# Patient Record
Sex: FEMALE | Race: WHITE | Employment: FULL TIME | URBAN - METROPOLITAN AREA
[De-identification: names, ages, dates, MRNs, and addresses within clinical notes are randomized per-mention and may not be internally consistent; named-entity substitution may affect disease eponyms.]

---

## 2018-05-16 VITALS
SYSTOLIC BLOOD PRESSURE: 111 MMHG | BODY MASS INDEX: 29.45 KG/M2 | HEIGHT: 60 IN | RESPIRATION RATE: 16 BRPM | TEMPERATURE: 97.5 F | DIASTOLIC BLOOD PRESSURE: 70 MMHG | HEART RATE: 90 BPM | OXYGEN SATURATION: 99 % | WEIGHT: 150 LBS

## 2018-05-16 ASSESSMENT — PAIN DESCRIPTION - PAIN TYPE: TYPE: ACUTE PAIN

## 2018-05-16 ASSESSMENT — PAIN DESCRIPTION - ONSET: ONSET: ON-GOING

## 2018-05-16 ASSESSMENT — PAIN DESCRIPTION - PROGRESSION: CLINICAL_PROGRESSION: NOT CHANGED

## 2018-05-16 ASSESSMENT — PAIN DESCRIPTION - ORIENTATION: ORIENTATION: LEFT

## 2018-05-16 ASSESSMENT — PAIN SCALES - GENERAL: PAINLEVEL_OUTOF10: 7

## 2018-05-16 ASSESSMENT — PAIN DESCRIPTION - DESCRIPTORS: DESCRIPTORS: DULL;STABBING

## 2018-05-16 ASSESSMENT — PAIN DESCRIPTION - FREQUENCY: FREQUENCY: CONTINUOUS

## 2018-05-16 ASSESSMENT — PAIN DESCRIPTION - LOCATION: LOCATION: FOOT

## 2018-05-17 ENCOUNTER — APPOINTMENT (OUTPATIENT)
Dept: GENERAL RADIOLOGY | Age: 23
End: 2018-05-17
Payer: COMMERCIAL

## 2018-05-17 ENCOUNTER — HOSPITAL ENCOUNTER (EMERGENCY)
Age: 23
Discharge: HOME | End: 2018-05-17
Payer: COMMERCIAL

## 2018-05-17 DIAGNOSIS — S92.902A CLOSED FRACTURE OF LEFT FOOT, INITIAL ENCOUNTER: Primary | ICD-10-CM

## 2018-05-17 PROCEDURE — 29515 APPLICATION SHORT LEG SPLINT: CPT

## 2018-05-17 PROCEDURE — 99283 EMERGENCY DEPT VISIT LOW MDM: CPT

## 2018-05-17 PROCEDURE — 73630 X-RAY EXAM OF FOOT: CPT

## 2018-05-17 PROCEDURE — 6370000000 HC RX 637 (ALT 250 FOR IP): Performed by: NURSE PRACTITIONER

## 2018-05-17 RX ORDER — IBUPROFEN 800 MG/1
800 TABLET ORAL EVERY 8 HOURS PRN
Qty: 21 TABLET | Refills: 0 | Status: SHIPPED | OUTPATIENT
Start: 2018-05-17 | End: 2018-07-03 | Stop reason: SDUPTHER

## 2018-05-17 RX ORDER — OXYCODONE HYDROCHLORIDE AND ACETAMINOPHEN 5; 325 MG/1; MG/1
1 TABLET ORAL ONCE
Status: COMPLETED | OUTPATIENT
Start: 2018-05-17 | End: 2018-05-17

## 2018-05-17 RX ADMIN — OXYCODONE HYDROCHLORIDE AND ACETAMINOPHEN 1 TABLET: 5; 325 TABLET ORAL at 01:12

## 2018-05-17 ASSESSMENT — PAIN SCALES - GENERAL
PAINLEVEL_OUTOF10: 7
PAINLEVEL_OUTOF10: 3

## 2018-07-03 ENCOUNTER — APPOINTMENT (OUTPATIENT)
Dept: CT IMAGING | Age: 23
End: 2018-07-03
Payer: OTHER MISCELLANEOUS

## 2018-07-03 ENCOUNTER — APPOINTMENT (OUTPATIENT)
Dept: GENERAL RADIOLOGY | Age: 23
End: 2018-07-03
Payer: OTHER MISCELLANEOUS

## 2018-07-03 ENCOUNTER — HOSPITAL ENCOUNTER (EMERGENCY)
Age: 23
Discharge: HOME | End: 2018-07-03
Payer: OTHER MISCELLANEOUS

## 2018-07-03 VITALS
TEMPERATURE: 97.4 F | RESPIRATION RATE: 14 BRPM | WEIGHT: 180 LBS | DIASTOLIC BLOOD PRESSURE: 81 MMHG | BODY MASS INDEX: 35.34 KG/M2 | SYSTOLIC BLOOD PRESSURE: 130 MMHG | HEART RATE: 98 BPM | OXYGEN SATURATION: 99 % | HEIGHT: 60 IN

## 2018-07-03 DIAGNOSIS — S09.90XA CLOSED HEAD INJURY, INITIAL ENCOUNTER: Primary | ICD-10-CM
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
## 2018-07-03 LAB
CHP ED QC CHECK: YES
PREGNANCY TEST URINE, POC: NEGATIVE

## 2018-07-03 PROCEDURE — 6370000000 HC RX 637 (ALT 250 FOR IP): Performed by: NURSE PRACTITIONER

## 2018-07-03 PROCEDURE — 72125 CT NECK SPINE W/O DYE: CPT

## 2018-07-03 PROCEDURE — 70450 CT HEAD/BRAIN W/O DYE: CPT

## 2018-07-03 PROCEDURE — 99284 EMERGENCY DEPT VISIT MOD MDM: CPT

## 2018-07-03 PROCEDURE — 71046 X-RAY EXAM CHEST 2 VIEWS: CPT

## 2018-07-03 RX ORDER — CYCLOBENZAPRINE HCL 5 MG
5 TABLET ORAL 3 TIMES DAILY PRN
Qty: 21 TABLET | Refills: 0 | Status: SHIPPED | OUTPATIENT
Start: 2018-07-03 | End: 2018-07-13

## 2018-07-03 RX ORDER — IBUPROFEN 800 MG/1
800 TABLET ORAL ONCE
Status: COMPLETED | OUTPATIENT
Start: 2018-07-03 | End: 2018-07-03

## 2018-07-03 RX ORDER — IBUPROFEN 800 MG/1
800 TABLET ORAL EVERY 8 HOURS PRN
Qty: 21 TABLET | Refills: 0 | Status: SHIPPED | OUTPATIENT
Start: 2018-07-03 | End: 2019-06-27

## 2018-07-03 RX ADMIN — IBUPROFEN 800 MG: 800 TABLET ORAL at 18:23

## 2018-07-03 ASSESSMENT — PAIN DESCRIPTION - DESCRIPTORS: DESCRIPTORS: ACHING

## 2018-07-03 ASSESSMENT — PAIN DESCRIPTION - PROGRESSION: CLINICAL_PROGRESSION: GRADUALLY WORSENING

## 2018-07-03 ASSESSMENT — PAIN SCALES - GENERAL
PAINLEVEL_OUTOF10: 7
PAINLEVEL_OUTOF10: 7

## 2018-07-03 ASSESSMENT — PAIN DESCRIPTION - PAIN TYPE: TYPE: ACUTE PAIN

## 2018-07-03 ASSESSMENT — PAIN DESCRIPTION - FREQUENCY: FREQUENCY: CONTINUOUS

## 2018-07-03 ASSESSMENT — PAIN DESCRIPTION - LOCATION: LOCATION: HEAD

## 2018-07-03 NOTE — ED NOTES
Patient states she was ambulatory and able to get out of the car on her own      Nancy Eric, [address] [facility]  07/03/18 4780
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
## 2018-10-14 ENCOUNTER — APPOINTMENT (OUTPATIENT)
Dept: GENERAL RADIOLOGY | Age: 23
End: 2018-10-14
Payer: COMMERCIAL

## 2018-10-14 ENCOUNTER — HOSPITAL ENCOUNTER (EMERGENCY)
Age: 23
Discharge: HOME | End: 2018-10-14
Payer: COMMERCIAL

## 2018-10-14 VITALS
HEIGHT: 60 IN | HEART RATE: 72 BPM | OXYGEN SATURATION: 98 % | WEIGHT: 170 LBS | SYSTOLIC BLOOD PRESSURE: 131 MMHG | RESPIRATION RATE: 16 BRPM | TEMPERATURE: 97.5 F | BODY MASS INDEX: 33.38 KG/M2 | DIASTOLIC BLOOD PRESSURE: 81 MMHG

## 2018-10-14 DIAGNOSIS — K59.00 CONSTIPATION, UNSPECIFIED CONSTIPATION TYPE: ICD-10-CM

## 2018-10-14 DIAGNOSIS — R10.84 GENERALIZED ABDOMINAL PAIN: Primary | ICD-10-CM

## 2018-10-14 LAB
ALBUMIN SERPL-MCNC: 4.1 G/DL (ref 3.5–5.2)
ALP BLD-CCNC: 83 U/L (ref 35–104)
ALT SERPL-CCNC: 68 U/L (ref 0–32)
ANION GAP SERPL CALCULATED.3IONS-SCNC: 8 MMOL/L (ref 7–16)
AST SERPL-CCNC: 66 U/L (ref 0–31)
BACTERIA: ABNORMAL /HPF
BASOPHILS ABSOLUTE: 0.08 E9/L (ref 0–0.2)
BASOPHILS RELATIVE PERCENT: 0.6 % (ref 0–2)
BILIRUB SERPL-MCNC: 0.2 MG/DL (ref 0–1.2)
BILIRUBIN URINE: NEGATIVE
BLOOD, URINE: ABNORMAL
BUN BLDV-MCNC: 13 MG/DL (ref 6–20)
CALCIUM SERPL-MCNC: 8.9 MG/DL (ref 8.6–10.2)
CHLORIDE BLD-SCNC: 103 MMOL/L (ref 98–107)
CHP ED QC CHECK: YES
CLARITY: CLEAR
CO2: 23 MMOL/L (ref 22–29)
COLOR: ABNORMAL
CREAT SERPL-MCNC: 0.7 MG/DL (ref 0.5–1)
EOSINOPHILS ABSOLUTE: 0.03 E9/L (ref 0.05–0.5)
EOSINOPHILS RELATIVE PERCENT: 0.2 % (ref 0–6)
GFR AFRICAN AMERICAN: >60
GFR NON-AFRICAN AMERICAN: >60 ML/MIN/1.73
GLUCOSE BLD-MCNC: 108 MG/DL (ref 74–109)
GLUCOSE URINE: NEGATIVE MG/DL
HCT VFR BLD CALC: 42.3 % (ref 34–48)
HEMOGLOBIN: 13.5 G/DL (ref 11.5–15.5)
IMMATURE GRANULOCYTES #: 0.04 E9/L
IMMATURE GRANULOCYTES %: 0.3 % (ref 0–5)
KETONES, URINE: NEGATIVE MG/DL
LACTIC ACID: 1.1 MMOL/L (ref 0.5–2.2)
LEUKOCYTE ESTERASE, URINE: NEGATIVE
LYMPHOCYTES ABSOLUTE: 0.91 E9/L (ref 1.5–4)
LYMPHOCYTES RELATIVE PERCENT: 7.2 % (ref 20–42)
MCH RBC QN AUTO: 26.8 PG (ref 26–35)
MCHC RBC AUTO-ENTMCNC: 31.9 % (ref 32–34.5)
MCV RBC AUTO: 83.9 FL (ref 80–99.9)
MONOCYTES ABSOLUTE: 0.54 E9/L (ref 0.1–0.95)
MONOCYTES RELATIVE PERCENT: 4.3 % (ref 2–12)
NEUTROPHILS ABSOLUTE: 11.04 E9/L (ref 1.8–7.3)
NEUTROPHILS RELATIVE PERCENT: 87.4 % (ref 43–80)
NITRITE, URINE: NEGATIVE
PDW BLD-RTO: 13.9 FL (ref 11.5–15)
PH UA: 6 (ref 5–9)
PLATELET # BLD: 340 E9/L (ref 130–450)
PMV BLD AUTO: 10.7 FL (ref 7–12)
POTASSIUM SERPL-SCNC: 4 MMOL/L (ref 3.5–5)
POTASSIUM SERPL-SCNC: >10 MMOL/L (ref 3.5–5)
PREGNANCY TEST URINE, POC: NEGATIVE
PROTEIN UA: 30 MG/DL
RBC # BLD: 5.04 E12/L (ref 3.5–5.5)
RBC UA: >20 /HPF (ref 0–2)
SODIUM BLD-SCNC: 134 MMOL/L (ref 132–146)
SPECIFIC GRAVITY UA: 1.01 (ref 1–1.03)
TOTAL PROTEIN: 7.3 G/DL (ref 6.4–8.3)
UROBILINOGEN, URINE: 0.2 E.U./DL
WBC # BLD: 12.6 E9/L (ref 4.5–11.5)
WBC UA: ABNORMAL /HPF (ref 0–5)

## 2018-10-14 PROCEDURE — 83605 ASSAY OF LACTIC ACID: CPT

## 2018-10-14 PROCEDURE — 80053 COMPREHEN METABOLIC PANEL: CPT

## 2018-10-14 PROCEDURE — 74019 RADEX ABDOMEN 2 VIEWS: CPT

## 2018-10-14 PROCEDURE — 6370000000 HC RX 637 (ALT 250 FOR IP): Performed by: NURSE PRACTITIONER

## 2018-10-14 PROCEDURE — 81001 URINALYSIS AUTO W/SCOPE: CPT

## 2018-10-14 PROCEDURE — 84132 ASSAY OF SERUM POTASSIUM: CPT

## 2018-10-14 PROCEDURE — 6360000002 HC RX W HCPCS: Performed by: PHYSICIAN ASSISTANT

## 2018-10-14 PROCEDURE — 87088 URINE BACTERIA CULTURE: CPT

## 2018-10-14 PROCEDURE — 36415 COLL VENOUS BLD VENIPUNCTURE: CPT

## 2018-10-14 PROCEDURE — 85025 COMPLETE CBC W/AUTO DIFF WBC: CPT

## 2018-10-14 PROCEDURE — 99284 EMERGENCY DEPT VISIT MOD MDM: CPT

## 2018-10-14 RX ORDER — ONDANSETRON 4 MG/1
4 TABLET, ORALLY DISINTEGRATING ORAL ONCE
Status: COMPLETED | OUTPATIENT
Start: 2018-10-14 | End: 2018-10-14

## 2018-10-14 RX ORDER — SENNA AND DOCUSATE SODIUM 50; 8.6 MG/1; MG/1
1 TABLET, FILM COATED ORAL DAILY
Qty: 5 TABLET | Refills: 0 | Status: SHIPPED | OUTPATIENT
Start: 2018-10-14 | End: 2019-06-27

## 2018-10-14 RX ADMIN — ONDANSETRON 4 MG: 4 TABLET, ORALLY DISINTEGRATING ORAL at 14:38

## 2018-10-14 RX ADMIN — MAGNESIUM CITRATE 300 ML: 1.75 LIQUID ORAL at 16:41

## 2018-10-14 ASSESSMENT — PAIN DESCRIPTION - ORIENTATION: ORIENTATION: LEFT

## 2018-10-14 ASSESSMENT — PAIN DESCRIPTION - DESCRIPTORS: DESCRIPTORS: STABBING

## 2018-10-14 ASSESSMENT — PAIN DESCRIPTION - LOCATION: LOCATION: ABDOMEN

## 2018-10-14 ASSESSMENT — PAIN SCALES - GENERAL: PAINLEVEL_OUTOF10: 8

## 2018-10-14 ASSESSMENT — PAIN DESCRIPTION - PAIN TYPE: TYPE: ACUTE PAIN

## 2018-10-14 NOTE — ED PROVIDER NOTES
Independent [facility]       Department of Emergency Medicine   ED  Provider Note  Admit Date/Time: 10/14/2018  2:13 PM  ED Bed: 06/06  MRN: [id number]  Chief Complaint       Abdominal Pain (starting at 0830 today, left sided abd pain, with n/v no diarrhea, no fever or chills )    History of Present Illness   Source of history provided by:  patient and parent. History/Exam Limitations: none. Judith Muñoz is a 20 y.o. old female who has a past medical history of:   Past Medical History:   Diagnosis Date    Acid reflux     Hydradenitis     presents to the emergency department by private vehicle and ambulatory, for complaints of sudden onset aching, sharp pain in the LLQ without radiation which began several hour(s) prior to arrival which has since resolved. There has been similar episodes in the past as she has a history of constipation especially a week before her period which she is due. She also reports Trintellix giving her constipation therefore she has not taken it in a while. LMP on time and of typical flow and duration. Since onset the symptoms have been improving. The pain is associated with urinary frequency, urinary urgency and vomiting. The pain is aggravated by nothing in particular and self limiting. There has been NO back pain, chills, cloudy urine, diarrhea, vaginal discharge, vomiting or fever. ROS   Pertinent positives and negatives are stated within HPI, all other systems reviewed and are negative. Past Surgical History:   Procedure Laterality Date    ARM SURGERY      WISDOM TOOTH EXTRACTION  03/08/2015   Social History:  reports that she has been smoking Cigarettes. She has a 5.00 pack-year smoking history. She has never used smokeless tobacco. She reports that she drinks alcohol. She reports that she does not use drugs. Family History: family history includes Diabetes in her mother; Hypertension in her father.    Allergies: Bee venom    Physical Exam           ED Triage Vitals (4 mg Oral Given 10/14/18 5598)        Re-examination:  10/14/18       Time: 1615    Patients symptoms show no change. She continues to deny abdominal pain. Her abdomen is soft with active bowel sounds and no change in physical exam. Discussed results at length as well as outpatient plan of care which patient and mother are amenable. Consults:   None    Procedures:   none    MDM:   Labs and diagnostics as resulted above. Patient has no abdominal pain in ED. there is no evidence of acute abdomen as her abdomen is soft, nontender and active bowel sounds. Plan is for magnesium citrate, daily Shauna-Colace for five days and outpatient follow-up with a primary care physician. She is aware signs and symptoms to watch for return to ED for any new or worsening symptoms. Otherwise patient is well-appearing, nontoxic and appropriate for outpatient treatment and management at the time of her evaluation. Counseling: The emergency provider has spoken with the patient and daughter and discussed todays results, in addition to providing specific details for the plan of care and counseling regarding the diagnosis and prognosis. Questions are answered at this time and they are agreeable with the plan. Assessment      1. Generalized abdominal pain    2. Constipation, unspecified constipation type      Plan   Discharge to home  Patient condition is stable    New Medications     New Prescriptions    SENNOSIDES-DOCUSATE SODIUM (SENOKOT-S) 8.6-50 MG TABLET    Take 1 tablet by mouth daily     Electronically signed by EDDIE Granger CNP   DD: 10/14/18  **This report was transcribed using voice recognition software. Every effort was made to ensure accuracy; however, inadvertent computerized transcription errors may be present.   END OF ED PROVIDER NOTE       EDDIE Granger CNP  10/14/18 3499

## 2018-10-16 LAB — URINE CULTURE, ROUTINE: NORMAL

## 2022-04-10 ENCOUNTER — APPOINTMENT (OUTPATIENT)
Dept: GENERAL RADIOLOGY | Age: 27
End: 2022-04-10
Payer: COMMERCIAL

## 2022-04-10 ENCOUNTER — HOSPITAL ENCOUNTER (EMERGENCY)
Age: 27
Discharge: HOME | End: 2022-04-10
Payer: COMMERCIAL

## 2022-04-10 VITALS
RESPIRATION RATE: 16 BRPM | OXYGEN SATURATION: 99 % | SYSTOLIC BLOOD PRESSURE: 130 MMHG | BODY MASS INDEX: 39.27 KG/M2 | TEMPERATURE: 98.3 F | HEART RATE: 103 BPM | WEIGHT: 200 LBS | HEIGHT: 60 IN | DIASTOLIC BLOOD PRESSURE: 78 MMHG

## 2022-04-10 DIAGNOSIS — S62.102A CLOSED FRACTURE OF LEFT WRIST, INITIAL ENCOUNTER: Primary | ICD-10-CM

## 2022-04-10 PROCEDURE — 6370000000 HC RX 637 (ALT 250 FOR IP): Performed by: NURSE PRACTITIONER

## 2022-04-10 PROCEDURE — 73100 X-RAY EXAM OF WRIST: CPT

## 2022-04-10 PROCEDURE — 99283 EMERGENCY DEPT VISIT LOW MDM: CPT

## 2022-04-10 PROCEDURE — 29125 APPL SHORT ARM SPLINT STATIC: CPT

## 2022-04-10 PROCEDURE — 73110 X-RAY EXAM OF WRIST: CPT

## 2022-04-10 RX ORDER — IBUPROFEN 800 MG/1
800 TABLET ORAL EVERY 8 HOURS PRN
Qty: 21 TABLET | Refills: 0 | Status: SHIPPED | OUTPATIENT
Start: 2022-04-10 | End: 2022-08-12

## 2022-04-10 RX ORDER — HYDROCODONE BITARTRATE AND ACETAMINOPHEN 5; 325 MG/1; MG/1
1 TABLET ORAL EVERY 4 HOURS PRN
Qty: 8 TABLET | Refills: 0 | Status: SHIPPED | OUTPATIENT
Start: 2022-04-10 | End: 2022-04-13

## 2022-04-10 RX ORDER — IBUPROFEN 800 MG/1
800 TABLET ORAL ONCE
Status: COMPLETED | OUTPATIENT
Start: 2022-04-10 | End: 2022-04-10

## 2022-04-10 RX ADMIN — IBUPROFEN 800 MG: 800 TABLET, FILM COATED ORAL at 21:09

## 2022-04-10 ASSESSMENT — PAIN DESCRIPTION - ORIENTATION: ORIENTATION: LEFT

## 2022-04-10 ASSESSMENT — PAIN SCALES - GENERAL
PAINLEVEL_OUTOF10: 8
PAINLEVEL_OUTOF10: 8

## 2022-04-10 ASSESSMENT — PAIN DESCRIPTION - LOCATION: LOCATION: WRIST

## 2022-04-10 NOTE — Clinical Note
Vivian Doran was seen and treated in our emergency department on 4/10/2022. She may return to work on 04/16/2022. If you have any questions or concerns, please don't hesitate to call.       Nikia Abdi, APRN - CNP

## 2022-04-11 NOTE — ED PROVIDER NOTES
Independent   HPI:  4/10/22, Time: 8:57 PM EDT         Joelle Ohara is a 27 y.o. female presenting to the ED for left wrist pain. Patient presents to the emergency department after having a mechanical fall 3 to 4 hours prior to arrival.  Patient reports that she fell while skating. She attempted to break her fall by putting her hands out francis behind her back but ended up still injuring her left wrist.  Patient does have notable deformity to the dorsal aspect of the left radial aspect. She reports the pain will radiate up into the forearm. She denies taking anything for pain relief. She denies any unusual paresthesia. She is not on any anticoagulant therapy. Patient denies any other areas of injury. She states that throughout the evening hours the pain worsened which brought her here to the emergency department. Symptoms moderate in severity and persistent. Review of Systems:   A complete review of systems was performed and pertinent positives and negatives are stated within HPI, all other systems reviewed and are negative.          --------------------------------------------- PAST HISTORY ---------------------------------------------  Past Medical History:  has a past medical history of Acid reflux and Hydradenitis. Past Surgical History:  has a past surgical history that includes Arm Surgery and [place] tooth extraction (03/08/2015). Social History:  reports that she has quit smoking. Her smoking use included cigarettes. She has a 5.00 pack-year smoking history. She has never used smokeless tobacco. She reports that she does not drink alcohol and does not use drugs. Family History: family history includes Diabetes in her mother; Hypertension in her father. The patients home medications have been reviewed.     Allergies: Bee venom    -------------------------------------------------- RESULTS -------------------------------------------------  All laboratory and radiology results have been personally reviewed by myself   LABS:  No results found for this visit on 04/10/22. RADIOLOGY:  Interpreted by Radiologist.  XR WRIST LEFT (2 VIEWS)   Final Result   Acute nondisplaced intra-articular fracture of the distal left radius. XR WRIST LEFT (MIN 3 VIEWS)   Final Result   Comminuted, intra-articular distal radius fracture.             ------------------------- NURSING NOTES AND VITALS REVIEWED ---------------------------   The nursing notes within the ED encounter and vital signs as below have been reviewed. /78   Pulse 103   Temp 98.3 °F (36.8 °C) (Oral)   Resp 16   Ht 5' (1.524 m)   Wt 200 lb (90.7 kg)   SpO2 99%   BMI 39.06 kg/m²   Oxygen Saturation Interpretation: Normal      ---------------------------------------------------PHYSICAL EXAM--------------------------------------      Constitutional/General: Alert and oriented x3, mildly uncomfortable  Head: Normocephalic and atraumatic  Eyes: PERRL, EOMI  Mouth: Oropharynx clear, handling secretions, no trismus  Neck: Supple, full ROM,   Pulmonary: Lungs clear to auscultation bilaterally, no wheezes, rales, or rhonchi. Not in respiratory distress  Cardiovascular:  Regular rate and rhythm, no murmurs, gallops, or rubs. 2+ distal pulses  Abdomen: Soft, non tender, non distended,   Extremities: Moves all extremities x 4. Warm and well perfused, patient with point tenderness to the dorsal aspect of the left wrist radial side. She does have 2+ radial pulse. Unable to perform pronation or supination and patient did not attempt this exam due to notable deformity. She does have grasp. And full sensations intact. Compartments otherwise soft.   Skin: warm and dry without rash  Neurologic: GCS 15,  Psych: Normal Affect      ------------------------------ ED COURSE/MEDICAL DECISION MAKING----------------------  Medications   ibuprofen (ADVIL;MOTRIN) tablet 800 mg (800 mg Oral Given 4/10/22 2109)         ED COURSE:       Medical Decision Making:    Plan be for imaging, will medicate for symptom relief. X-ray resulted, showing comminuted intra-articular distal radius fracture. Patient was made aware of this finding. Will consult orthopedic surgery resident. Please refer to orthopedic surgery resident note. Patient did have a bedside splint applied by the orthopedic resident. Patient will be discharged home with close follow-up by orthopedic surgeon within 1 week. She was educated on nonweightbearing status to left upper extremity, the importance of rest, ice, compression and elevation as well as taking meds for symptom relief and when to return back to the emergency department. Patient neurovascular intact. Patient expressed understanding and safely discharged home with prescriptions for pain relief. Counseling: The emergency provider has spoken with the patient and discussed todays results, in addition to providing specific details for the plan of care and counseling regarding the diagnosis and prognosis. Questions are answered at this time and they are agreeable with the plan.      --------------------------------- IMPRESSION AND DISPOSITION ---------------------------------    IMPRESSION  1. Closed fracture of left wrist, initial encounter        DISPOSITION  Disposition: Discharge to home  Patient condition is good      NOTE: This report was transcribed using voice recognition software.  Every effort was made to ensure accuracy; however, inadvertent computerized transcription errors may be present     EDDIE Adler CNP  04/11/22 0336      ATTENDING PROVIDER ATTESTATION:     Supervising Physician, on-site, available for consultation, non-participatory in the evaluation or care of this patient         Oksana Adams MD  04/11/22 0817

## 2022-04-11 NOTE — CONSULTS
Department of Orthopedic Surgery  Resident Consult Note          Reason for Consult: Left wrist pain after fall    HISTORY OF PRESENT ILLNESS:       Patient is a 27 y.o. female who presents with who presents with a chief complaint of left wrist pain after fall. Patient states she was rollerblading earlier today when she lost her balance and fell backwards onto extended wrist.  Patient noted that she had immediate onset left wrist pain. Had difficulty moving her wrist without pain. States that she is right-hand dominant. Denies any previous left wrist problems. States that his sharp in nature and worse with any movement. She denies any deformity to the left wrist.  Currently states that she does have some paresthesias into the entire hand. States it is better than it was immediately after the injury. Currently denying any other orthopedic complaints at this time. Past Medical History:        Diagnosis Date    Acid reflux     Hydradenitis      Past Surgical History:        Procedure Laterality Date    ARM SURGERY      WISDOM TOOTH EXTRACTION  03/08/2015     Current Medications:   No current facility-administered medications for this encounter. Allergies:  Bee venom    Social History:   TOBACCO:   reports that she has quit smoking. Her smoking use included cigarettes. She has a 5.00 pack-year smoking history. She has never used smokeless tobacco.  ETOH:   reports no history of alcohol use. DRUGS:   reports no history of drug use.   ACTIVITIES OF DAILY LIVING:    OCCUPATION:    Family History:       Problem Relation Age of Onset    Diabetes Mother     Hypertension Father        REVIEW OF SYSTEMS:  CONSTITUTIONAL:  negative for  fevers, chills  EYES:  negative for acute vision changes  HEENT:  negative for cough, hearing loss  RESPIRATORY:  negative for  dyspnea, wheezing  CARDIOVASCULAR:  negative for  chest pain  GASTROINTESTINAL:  negative for nausea, vomiting  GENITOURINARY:  negative for frequency, urinary incontinence  HEMATOLOGIC/LYMPHATIC:  negative for bleeding  MUSCULOSKELETAL:  positive for left wrist pain and swelling  NEUROLOGICAL:  negative for headaches, dizziness  BEHAVIOR/PSYCH:  negative for increased agitation and anxiety    PHYSICAL EXAM:    VITALS:  /78   Pulse 103   Temp 98.3 °F (36.8 °C) (Oral)   Resp 16   Ht 5' (1.524 m)   Wt 200 lb (90.7 kg)   SpO2 99%   BMI 39.06 kg/m²   CONSTITUTIONAL:  awake, alert, cooperative, no apparent distress, and appears stated age  MUSCULOSKELETAL:  Left upper Extremity:  · Skin examination reveals no superficial lacerations or abrasions. There is dorsal soft tissue edema noted over the wrist and the distal forearm. Mild edema noted to the hand. No ecchymosis or erythema noted at this time  · TTP over the distal radius dorsal greater than volar. As well as the distal dorsal forearm. No tenderness palpation about the fingers, hand, proximal forearm, elbow, arm, shoulder, clavicle  · Pain with both passive and active range of motion  · Sensation intact light touch distally in pain, radial, ulnar nerve distribution  · Motor function grossly intact distally in AIN, PIN, median, radial, ulnar nerve distribution  · Compartment soft and compressible  · +2/4 ulnar pulses, hand warm well-perfused, brisk capillary refill in all digits    Secondary Exam:   · rightUE: No obvious signs of trauma. -TTP to fingers, hand, wrist, forearm, elbow, humerus, shoulder or clavicle. · bilateralLE: No obvious signs of trauma. -TTP to foot, ankle, leg, knee, thigh, hip.     · Pelvis: -TTP, -Log roll, -Heel strike     DATA:    CBC:   Lab Results   Component Value Date    WBC 12.6 10/14/2018    RBC 5.04 10/14/2018    HGB 13.5 10/14/2018    HCT 42.3 10/14/2018    MCV 83.9 10/14/2018    MCH 26.8 10/14/2018    MCHC 31.9 10/14/2018    RDW 13.9 10/14/2018     10/14/2018    MPV 10.7 10/14/2018     PT/INR:  No results found for: PROTIME, INR    Radiology Review:  XR 3 views of the left wrist reviewed demonstrating a nondisplaced intra-articular distal radius fracture. Extension into the dorsal aspect of the lunate facet. No other acute fracture dislocations noted. No other bony or soft tissue abnormalities noted. 2 views of the left wrist status post in situ splinting demonstrate appropriate splint position and alignment. IMPRESSION:  · Closed, left nondisplaced intra-articular distal radius fracture    PLAN:  · Nonweightbearing left upper extremity, well-padded sugar tong splint applied, sling given for comfort  · Pain control per ED  · No acute orthopedic invention planned, patient will follow up as an outpatient with Dr. Guanaco Patterson for repeat evaluation and x-rays. · Patient educated on proper splint care  · All patient/family questions have been answered and patient is currently agreeable to plan.   · Discuss with Attending      Electronically signed by Audrey Borges DO on 4/10/2022 at 10:39 PM

## 2022-07-11 ENCOUNTER — HOSPITAL ENCOUNTER (EMERGENCY)
Age: 27
Discharge: HOME | End: 2022-07-11
Payer: COMMERCIAL

## 2022-07-11 ENCOUNTER — APPOINTMENT (OUTPATIENT)
Dept: GENERAL RADIOLOGY | Age: 27
End: 2022-07-11
Payer: COMMERCIAL

## 2022-07-11 VITALS
HEART RATE: 81 BPM | SYSTOLIC BLOOD PRESSURE: 125 MMHG | OXYGEN SATURATION: 91 % | TEMPERATURE: 98.1 F | DIASTOLIC BLOOD PRESSURE: 87 MMHG

## 2022-07-11 DIAGNOSIS — J40 BRONCHITIS: Primary | ICD-10-CM

## 2022-07-11 LAB
ALBUMIN SERPL-MCNC: 4.1 G/DL (ref 3.5–5.2)
ALP BLD-CCNC: 117 U/L (ref 35–104)
ALT SERPL-CCNC: 30 U/L (ref 0–32)
ANION GAP SERPL CALCULATED.3IONS-SCNC: 13 MMOL/L (ref 7–16)
AST SERPL-CCNC: 24 U/L (ref 0–31)
BACTERIA: ABNORMAL /HPF
BASOPHILS ABSOLUTE: 0.1 E9/L (ref 0–0.2)
BASOPHILS RELATIVE PERCENT: 0.9 % (ref 0–2)
BILIRUB SERPL-MCNC: 0.3 MG/DL (ref 0–1.2)
BILIRUBIN URINE: NEGATIVE
BLOOD, URINE: ABNORMAL
BUN BLDV-MCNC: 13 MG/DL (ref 6–20)
CALCIUM SERPL-MCNC: 8.8 MG/DL (ref 8.6–10.2)
CHLORIDE BLD-SCNC: 104 MMOL/L (ref 98–107)
CLARITY: CLEAR
CO2: 20 MMOL/L (ref 22–29)
COLOR: YELLOW
CREAT SERPL-MCNC: 0.8 MG/DL (ref 0.5–1)
EOSINOPHILS ABSOLUTE: 0.8 E9/L (ref 0.05–0.5)
EOSINOPHILS RELATIVE PERCENT: 7.3 % (ref 0–6)
EPITHELIAL CELLS, UA: ABNORMAL /HPF
GFR AFRICAN AMERICAN: >60
GFR NON-AFRICAN AMERICAN: >60 ML/MIN/1.73
GLUCOSE BLD-MCNC: 117 MG/DL (ref 74–99)
GLUCOSE URINE: NEGATIVE MG/DL
HCG(URINE) PREGNANCY TEST: NEGATIVE
HCT VFR BLD CALC: 40.1 % (ref 34–48)
HEMOGLOBIN: 13 G/DL (ref 11.5–15.5)
IMMATURE GRANULOCYTES #: 0.04 E9/L
IMMATURE GRANULOCYTES %: 0.4 % (ref 0–5)
KETONES, URINE: NEGATIVE MG/DL
LEUKOCYTE ESTERASE, URINE: NEGATIVE
LYMPHOCYTES ABSOLUTE: 2.32 E9/L (ref 1.5–4)
LYMPHOCYTES RELATIVE PERCENT: 21.2 % (ref 20–42)
MCH RBC QN AUTO: 25.7 PG (ref 26–35)
MCHC RBC AUTO-ENTMCNC: 32.4 % (ref 32–34.5)
MCV RBC AUTO: 79.4 FL (ref 80–99.9)
MONOCYTES ABSOLUTE: 0.69 E9/L (ref 0.1–0.95)
MONOCYTES RELATIVE PERCENT: 6.3 % (ref 2–12)
MUCUS: PRESENT /LPF
NEUTROPHILS ABSOLUTE: 6.99 E9/L (ref 1.8–7.3)
NEUTROPHILS RELATIVE PERCENT: 63.9 % (ref 43–80)
NITRITE, URINE: NEGATIVE
PDW BLD-RTO: 13.8 FL (ref 11.5–15)
PH UA: 6 (ref 5–9)
PLATELET # BLD: 341 E9/L (ref 130–450)
PMV BLD AUTO: 10.1 FL (ref 7–12)
POTASSIUM SERPL-SCNC: 3.8 MMOL/L (ref 3.5–5)
PROTEIN UA: NEGATIVE MG/DL
RBC # BLD: 5.05 E12/L (ref 3.5–5.5)
RBC UA: ABNORMAL /HPF (ref 0–2)
SODIUM BLD-SCNC: 137 MMOL/L (ref 132–146)
SPECIFIC GRAVITY UA: >=1.03 (ref 1–1.03)
TOTAL PROTEIN: 6.8 G/DL (ref 6.4–8.3)
TROPONIN, HIGH SENSITIVITY: <6 NG/L (ref 0–9)
UROBILINOGEN, URINE: 0.2 E.U./DL
WBC # BLD: 10.9 E9/L (ref 4.5–11.5)
WBC UA: ABNORMAL /HPF (ref 0–5)

## 2022-07-11 PROCEDURE — 93005 ELECTROCARDIOGRAM TRACING: CPT | Performed by: NURSE PRACTITIONER

## 2022-07-11 PROCEDURE — 80053 COMPREHEN METABOLIC PANEL: CPT

## 2022-07-11 PROCEDURE — 85025 COMPLETE CBC W/AUTO DIFF WBC: CPT

## 2022-07-11 PROCEDURE — 6370000000 HC RX 637 (ALT 250 FOR IP): Performed by: NURSE PRACTITIONER

## 2022-07-11 PROCEDURE — 84484 ASSAY OF TROPONIN QUANT: CPT

## 2022-07-11 PROCEDURE — 71046 X-RAY EXAM CHEST 2 VIEWS: CPT

## 2022-07-11 PROCEDURE — 81025 URINE PREGNANCY TEST: CPT

## 2022-07-11 PROCEDURE — 99285 EMERGENCY DEPT VISIT HI MDM: CPT

## 2022-07-11 PROCEDURE — 94640 AIRWAY INHALATION TREATMENT: CPT

## 2022-07-11 PROCEDURE — 94664 DEMO&/EVAL PT USE INHALER: CPT

## 2022-07-11 PROCEDURE — 81001 URINALYSIS AUTO W/SCOPE: CPT

## 2022-07-11 RX ORDER — DOXYCYCLINE HYCLATE 100 MG
100 TABLET ORAL 2 TIMES DAILY
Qty: 20 TABLET | Refills: 0 | Status: SHIPPED | OUTPATIENT
Start: 2022-07-11 | End: 2022-07-21

## 2022-07-11 RX ORDER — ALBUTEROL SULFATE 90 UG/1
2 AEROSOL, METERED RESPIRATORY (INHALATION) 4 TIMES DAILY PRN
Qty: 18 G | Refills: 0 | Status: SHIPPED | OUTPATIENT
Start: 2022-07-11

## 2022-07-11 RX ORDER — PREDNISONE 20 MG/1
40 TABLET ORAL ONCE
Status: COMPLETED | OUTPATIENT
Start: 2022-07-11 | End: 2022-07-11

## 2022-07-11 RX ORDER — IPRATROPIUM BROMIDE AND ALBUTEROL SULFATE 2.5; .5 MG/3ML; MG/3ML
1 SOLUTION RESPIRATORY (INHALATION) ONCE
Status: COMPLETED | OUTPATIENT
Start: 2022-07-11 | End: 2022-07-11

## 2022-07-11 RX ORDER — BROMPHENIRAMINE MALEATE, PSEUDOEPHEDRINE HYDROCHLORIDE, AND DEXTROMETHORPHAN HYDROBROMIDE 2; 30; 10 MG/5ML; MG/5ML; MG/5ML
5 SYRUP ORAL 4 TIMES DAILY PRN
Qty: 120 ML | Refills: 0 | Status: SHIPPED | OUTPATIENT
Start: 2022-07-11 | End: 2022-07-16

## 2022-07-11 RX ORDER — METHYLPREDNISOLONE 4 MG/1
TABLET ORAL
Qty: 1 KIT | Refills: 0 | Status: SHIPPED | OUTPATIENT
Start: 2022-07-11 | End: 2022-07-17

## 2022-07-11 RX ADMIN — PREDNISONE 40 MG: 20 TABLET ORAL at 21:11

## 2022-07-11 RX ADMIN — IPRATROPIUM BROMIDE AND ALBUTEROL SULFATE 1 AMPULE: .5; 3 SOLUTION RESPIRATORY (INHALATION) at 21:57

## 2022-07-11 NOTE — Clinical Note
Dimitry Alberto was seen and treated in our emergency department on 7/11/2022. She may return to work on 07/13/2022. If you have any questions or concerns, please don't hesitate to call.       Charissa Oliva, APRN - CNP

## 2022-07-12 NOTE — ED PROVIDER NOTES
Independent MLP    HPI: Monica Sherman is a 27 y.o. female with a past medical history of  has a past medical history of Acid reflux and Hydradenitis. presenting with complaints of a cough with  sob. The patient states that these symptoms began gradually. The history is obtained from the patient. The patient states that he has had some subjective chills at home. Patient does complain of a mild cough associated with it that is nonproductive. Patient denies excessive fatigue or sleeping greater than 18 hours a day. Patient denies exposure to mononucleosis. The patient denies any abdominal pain, left upper quadrant fullness, or early satiety. The patient also denies difficulty breathing, hemoptysis, neck pain/stiffness, or blurry vision. Sx have persisted and are mildly worse which is what prompted the visit today. unknown exposure to sick contacts, presents for complaints of shortness of breath which began yesterday. She does not smoke cigarettes or tobacco however is using a vape. She denies any fever, body aches or chills. Denies any exposure to ill contacts.        ROS:   Pertinent positives and negatives are stated within HPI, all other systems reviewed and are negative.      --------------------------------------------- PAST HISTORY ---------------------------------------------  Past Medical History:  has a past medical history of Acid reflux and Hydradenitis. Past Surgical History:  has a past surgical history that includes Arm Surgery and [place] tooth extraction (03/08/2015). Social History:  reports that she has quit smoking. Her smoking use included cigarettes. She has a 5.00 pack-year smoking history. She has never used smokeless tobacco. She reports that she does not drink alcohol and does not use drugs. Family History: family history includes Diabetes in her mother; Hypertension in her father. The patients home medications have been reviewed.     Allergies: Bee venom    ------------------------- NURSING NOTES AND VITALS REVIEWED ---------------------------   The nursing notes within the ED encounter and vital signs as below have been reviewed by myself. /87   Pulse 81   Temp 98.1 °F (36.7 °C) (Oral)   SpO2 91%   Oxygen Saturation Interpretation: Normal    The patients available past medical records and past encounters were reviewed. Physical exam:  Constitutional: Vital signs are reviewed the patient is comfortable. The patient is alert and oriented and conversant. Head: The head is atraumatic and normocephalic. Eyes: No discharge is present from the eyes. The sclera are normal.  ENT: The oropharynx demonstrates a small amount of erythema bilaterally. There is no tonsillar enlargement nor is there any exudate present. No uvular deviation or edema. No tonsillary asymmetry.  Floor of the mouth soft, no trismus, handling secretions. TMs bilaterally demonstrate no evidence of infection. Neck: Normal range of motion is achieved in the neck. There is no JVD present. No meningeal signs are present   Anterior cervical adenopathy is normal.  Respiratory/chest: The chest is nontender. Breath sounds are expiratory wheeze. There is no respiratory distress.   Cardiovascular: Heart shows a regular rate and rhythm without murmurs clicks or gallops. Abdominal exam: The abdomen is non tender without evidence of peritoneal signs.  Specific attention to the left upper quadrant with palpation of the spleen demonstrates no organomegaly or tenderness  Skin: warm and dry, without rash  Neurologic: GCS 15   Psych: Normal Affect  -------------------------------------------------- RESULTS -------------------------------------------------    LABS:  Results for orders placed or performed during the hospital encounter of 07/11/22   Troponin   Result Value Ref Range    Troponin, High Sensitivity <6 0 - 9 ng/L   CBC with Auto Differential   Result Value Ref Range    WBC 10.9 4.5 - 11.5 E9/L    RBC 5.05 3.50 - 5.50 E12/L    Hemoglobin 13.0 11.5 - 15.5 g/dL    Hematocrit 40.1 34.0 - 48.0 %    MCV 79.4 (L) 80.0 - 99.9 fL    MCH 25.7 (L) 26.0 - 35.0 pg    MCHC 32.4 32.0 - 34.5 %    RDW 13.8 11.5 - 15.0 fL    Platelets 866 120 - 232 E9/L    MPV 10.1 7.0 - 12.0 fL    Neutrophils % 63.9 43.0 - 80.0 %    Immature Granulocytes % 0.4 0.0 - 5.0 %    Lymphocytes % 21.2 20.0 - 42.0 %    Monocytes % 6.3 2.0 - 12.0 %    Eosinophils % 7.3 (H) 0.0 - 6.0 %    Basophils % 0.9 0.0 - 2.0 %    Neutrophils Absolute 6.99 1.80 - 7.30 E9/L    Immature Granulocytes # 0.04 E9/L    Lymphocytes Absolute 2.32 1.50 - 4.00 E9/L    Monocytes Absolute 0.69 0.10 - 0.95 E9/L    Eosinophils Absolute 0.80 (H) 0.05 - 0.50 E9/L    Basophils Absolute 0.10 0.00 - 0.20 E9/L   Comprehensive Metabolic Panel   Result Value Ref Range    Sodium 137 132 - 146 mmol/L    Potassium 3.8 3.5 - 5.0 mmol/L    Chloride 104 98 - 107 mmol/L    CO2 20 (L) 22 - 29 mmol/L    Anion Gap 13 7 - 16 mmol/L    Glucose 117 (H) 74 - 99 mg/dL    BUN 13 6 - 20 mg/dL    CREATININE 0.8 0.5 - 1.0 mg/dL    GFR Non-African American >60 >=60 mL/min/1.73    GFR African American >60     Calcium 8.8 8.6 - 10.2 mg/dL    Total Protein 6.8 6.4 - 8.3 g/dL    Albumin 4.1 3.5 - 5.2 g/dL    Total Bilirubin 0.3 0.0 - 1.2 mg/dL    Alkaline Phosphatase 117 (H) 35 - 104 U/L    ALT 30 0 - 32 U/L    AST 24 0 - 31 U/L   Urinalysis   Result Value Ref Range    Color, UA Yellow Straw/Yellow    Clarity, UA Clear Clear    Glucose, Ur Negative Negative mg/dL    Bilirubin Urine Negative Negative    Ketones, Urine Negative Negative mg/dL    Specific Gravity, UA >=1.030 1.005 - 1.030    Blood, Urine LARGE (A) Negative    pH, UA 6.0 5.0 - 9.0    Protein, UA Negative Negative mg/dL    Urobilinogen, Urine 0.2 <2.0 E.U./dL    Nitrite, Urine Negative Negative    Leukocyte Esterase, Urine Negative Negative   Pregnancy, urine   Result Value Ref Range    HCG(Urine) Pregnancy Test NEGATIVE NEGATIVE Microscopic Urinalysis   Result Value Ref Range    Mucus, UA Present (A) None Seen /LPF    WBC, UA 2-5 0 - 5 /HPF    RBC, UA 2-5 0 - 2 /HPF    Epithelial Cells, UA FEW /HPF    Bacteria, UA FEW (A) None Seen /HPF       RADIOLOGY:  Interpreted by Radiologist.  XR CHEST (2 VW)   Final Result   No acute process. ------------------------------ ED COURSE/MEDICAL DECISION MAKING----------------------  Medications   ipratropium-albuterol (DUONEB) nebulizer solution 1 ampule (1 ampule Inhalation Given 7/11/22 2157)   predniSONE (DELTASONE) tablet 40 mg (40 mg Oral Given 7/11/22 2111)             Medical Decision Making:      Labs are normal chest x-ray is negative no acute etiology no pneumonia. No WBC or any leukocytosis. Her lung sounds improved after the DuoNeb treatment was given. States she feels much better. Plan will be for discharge and outpatient management for bronchitis. She counseled on cessation of her \"vape\". Not hypoxic, nothing to suggests pneumonia. Well appearing, non toxic, appropriate for outpatient management. Plan is for symptom management and PCP follow up.         This patient's ED course included: a personal history and physicial eaxmination and re-evaluation prior to disposition    This patient has remained hemodynamically stable during their ED course. Counseling: The emergency provider has spoken with the patient and discussed todays results, in addition to providing specific details for the plan of care and counseling regarding the diagnosis and prognosis. Questions are answered at this time and they are agreeable with the plan.       --------------------------------- IMPRESSION AND DISPOSITION ---------------------------------    IMPRESSION  1.  Bronchitis        DISPOSITION  Disposition: Discharge to home  Patient condition is good             EDDIE Starr - CNP  07/11/22 6833

## 2022-07-13 LAB
EKG ATRIAL RATE: 74 BPM
EKG P AXIS: 21 DEGREES
EKG P-R INTERVAL: 130 MS
EKG Q-T INTERVAL: 398 MS
EKG QRS DURATION: 76 MS
EKG QTC CALCULATION (BAZETT): 441 MS
EKG R AXIS: 21 DEGREES
EKG T AXIS: 17 DEGREES
EKG VENTRICULAR RATE: 74 BPM

## 2022-11-18 ENCOUNTER — HOSPITAL ENCOUNTER (EMERGENCY)
Age: 27
Discharge: HOME | End: 2022-11-18
Attending: EMERGENCY MEDICINE
Payer: COMMERCIAL

## 2022-11-18 VITALS
OXYGEN SATURATION: 99 % | TEMPERATURE: 97.6 F | SYSTOLIC BLOOD PRESSURE: 130 MMHG | HEART RATE: 89 BPM | RESPIRATION RATE: 18 BRPM | DIASTOLIC BLOOD PRESSURE: 52 MMHG | WEIGHT: 196 LBS | BODY MASS INDEX: 38.28 KG/M2

## 2022-11-18 DIAGNOSIS — J06.9 UPPER RESPIRATORY TRACT INFECTION, UNSPECIFIED TYPE: ICD-10-CM

## 2022-11-18 DIAGNOSIS — J02.9 ACUTE PHARYNGITIS, UNSPECIFIED ETIOLOGY: ICD-10-CM

## 2022-11-18 DIAGNOSIS — H65.192 OTHER NON-RECURRENT ACUTE NONSUPPURATIVE OTITIS MEDIA OF LEFT EAR: Primary | ICD-10-CM

## 2022-11-18 LAB
INFLUENZA A: NOT DETECTED
INFLUENZA B: NOT DETECTED
SARS-COV-2 RNA, RT PCR: NOT DETECTED
STREP GRP A PCR: NEGATIVE

## 2022-11-18 PROCEDURE — 87636 SARSCOV2 & INF A&B AMP PRB: CPT

## 2022-11-18 PROCEDURE — 87880 STREP A ASSAY W/OPTIC: CPT

## 2022-11-18 PROCEDURE — 99283 EMERGENCY DEPT VISIT LOW MDM: CPT

## 2022-11-18 RX ORDER — METHYLPREDNISOLONE 4 MG/1
TABLET ORAL
Qty: 1 KIT | Refills: 0 | Status: SHIPPED | OUTPATIENT
Start: 2022-11-18 | End: 2022-11-24

## 2022-11-18 RX ORDER — AMOXICILLIN AND CLAVULANATE POTASSIUM 875; 125 MG/1; MG/1
1 TABLET, FILM COATED ORAL 2 TIMES DAILY
Qty: 20 TABLET | Refills: 0 | Status: SHIPPED | OUTPATIENT
Start: 2022-11-18 | End: 2022-11-28

## 2022-11-18 RX ORDER — AMOXICILLIN AND CLAVULANATE POTASSIUM 875; 125 MG/1; MG/1
1 TABLET, FILM COATED ORAL 2 TIMES DAILY
Qty: 14 TABLET | Refills: 0 | Status: SHIPPED | OUTPATIENT
Start: 2022-11-18 | End: 2022-11-18 | Stop reason: SDUPTHER

## 2022-11-18 ASSESSMENT — ENCOUNTER SYMPTOMS
ABDOMINAL PAIN: 0
BACK PAIN: 0
SHORTNESS OF BREATH: 0
COLOR CHANGE: 0
COUGH: 0
PHOTOPHOBIA: 0
DIARRHEA: 0
NAUSEA: 0

## 2022-11-18 ASSESSMENT — PAIN DESCRIPTION - ONSET: ONSET: ON-GOING

## 2022-11-18 ASSESSMENT — PAIN DESCRIPTION - PAIN TYPE: TYPE: ACUTE PAIN

## 2022-11-18 ASSESSMENT — PAIN DESCRIPTION - LOCATION: LOCATION: EAR;THROAT

## 2022-11-18 ASSESSMENT — PAIN DESCRIPTION - DESCRIPTORS: DESCRIPTORS: DISCOMFORT

## 2022-11-18 ASSESSMENT — PAIN - FUNCTIONAL ASSESSMENT
PAIN_FUNCTIONAL_ASSESSMENT: 0-10
PAIN_FUNCTIONAL_ASSESSMENT: NONE - DENIES PAIN

## 2022-11-18 ASSESSMENT — PAIN DESCRIPTION - FREQUENCY: FREQUENCY: CONTINUOUS

## 2022-11-18 ASSESSMENT — PAIN SCALES - GENERAL: PAINLEVEL_OUTOF10: 5

## 2022-11-18 ASSESSMENT — LIFESTYLE VARIABLES: HOW OFTEN DO YOU HAVE A DRINK CONTAINING ALCOHOL: NEVER

## 2022-11-18 NOTE — DISCHARGE INSTRUCTIONS
Call family doctor tomorrow and schedule a followup appointment to be seen in 2 days    Have your doctor obtain  finalized report of all test results    Take motrin or tylenol for fever or pain

## 2022-11-18 NOTE — ED NOTES
Pt given discharge summary with education on upper respiratory infection, ear infection and sinusitis. Pt also given prescriptions and work excuse. Pt aware to follow up with her primary care physician.      Lakeisha Crawford RN  11/18/22 [id number]

## 2022-11-18 NOTE — Clinical Note
Debra Hayes was seen and treated in our emergency department on 11/18/2022. She may return to work on 11/21/2022. If you have any questions or concerns, please don't hesitate to call.       Asael Disla, DO

## 2022-11-18 NOTE — ED PROVIDER NOTES
John Simon is a 32 y.o. female presenting to the ED for sore throat, left earache, beginning wed ago. The complaint has been intermittent, moderate in severity, and worsened by nothing. 24-year-old female complaining of sore throat left earache left tonsillar exudate has been going on since last Wednesday. Patient denies any fever or chills. She states she feels like she has sinus drainage. She denies any cough. Denies any shortness of breath denies any abdominal pain vomiting or diarrhea. She denies any right-sided ear symptoms. She notes her left ear is aching and dull and 5 out of 10. She notes no tinnitus or decreased hearing. Review of Systems:   Review of Systems   Constitutional:  Negative for chills and fatigue. HENT:  Positive for congestion and ear pain. Eyes:  Negative for photophobia and visual disturbance. Respiratory:  Negative for cough and shortness of breath. Cardiovascular:  Negative for chest pain and palpitations. Gastrointestinal:  Negative for abdominal pain, diarrhea and nausea. Endocrine: Negative for polyphagia and polyuria. Genitourinary:  Negative for flank pain and frequency. Musculoskeletal:  Negative for back pain and neck pain. Skin:  Negative for color change and pallor. Allergic/Immunologic: Negative for food allergies and immunocompromised state. Neurological:  Negative for dizziness and light-headedness. Hematological:  Negative for adenopathy. Does not bruise/bleed easily. Psychiatric/Behavioral:  Negative for agitation and confusion. The patient is not nervous/anxious and is not hyperactive.              --------------------------------------------- PAST HISTORY ---------------------------------------------  Past Medical History:  has a past medical history of Acid reflux and Hydradenitis. Past Surgical History:  has a past surgical history that includes Arm Surgery and [place] tooth extraction (03/08/2015).     Social History: reports that she has quit smoking. Her smoking use included cigarettes. She has a 5.00 pack-year smoking history. She has never used smokeless tobacco. She reports that she does not drink alcohol and does not use drugs. Family History: family history includes Diabetes in her mother; Hypertension in her father. The patients home medications have been reviewed. Allergies: Bee venom and Doxycycline    -------------------------------------------------- RESULTS -------------------------------------------------  All laboratory and radiology results have been personally reviewed by myself   LABS:  Results for orders placed or performed during the hospital encounter of 11/18/22   COVID-19 & Influenza Combo    Specimen: Nasopharyngeal Swab   Result Value Ref Range    SARS-CoV-2 RNA, RT PCR NOT DETECTED NOT DETECTED    INFLUENZA A NOT DETECTED NOT DETECTED    INFLUENZA B NOT DETECTED NOT DETECTED   Strep Screen Group A Throat    Specimen: Throat   Result Value Ref Range    Strep Grp A PCR Negative Negative       RADIOLOGY:  Interpreted by Radiologist.  No orders to display       ------------------------- NURSING NOTES AND VITALS REVIEWED ---------------------------   The nursing notes within the ED encounter and vital signs as below have been reviewed. BP (!) 130/52   Pulse 89   Temp 97.6 °F (36.4 °C)   Resp 18   Wt 196 lb (88.9 kg)   SpO2 99%   BMI 38.28 kg/m²   Oxygen Saturation Interpretation: Normal      ---------------------------------------------------PHYSICAL EXAM--------------------------------------  Physical Exam  Vitals reviewed. Constitutional:       General: She is not in acute distress. Appearance: Normal appearance. She is not toxic-appearing. HENT:      Head: Normocephalic and atraumatic. Right Ear: External ear normal.      Left Ear: External ear normal. Tympanic membrane is erythematous. Nose: Nose normal. No congestion.       Mouth/Throat:      Mouth: Mucous membranes are moist.      Pharynx: Oropharynx is clear. No posterior oropharyngeal erythema. Eyes:      Extraocular Movements: Extraocular movements intact. Pupils: Pupils are equal, round, and reactive to light. Cardiovascular:      Rate and Rhythm: Normal rate and regular rhythm. Pulses: Normal pulses. Heart sounds: No murmur heard. Pulmonary:      Effort: Pulmonary effort is normal.      Breath sounds: No wheezing or rhonchi. Chest:      Chest wall: No tenderness. Abdominal:      General: Bowel sounds are normal.      Tenderness: There is no abdominal tenderness. There is no right CVA tenderness, left CVA tenderness or guarding. Musculoskeletal:         General: No swelling or deformity. Cervical back: Normal range of motion and neck supple. No muscular tenderness. Skin:     General: Skin is warm and dry. Capillary Refill: Capillary refill takes less than 2 seconds. Neurological:      General: No focal deficit present. Mental Status: She is alert and oriented to person, place, and time. Psychiatric:         Mood and Affect: Mood normal.               ------------------------------ ED COURSE/MEDICAL DECISION MAKING----------------------  Medications - No data to display      ED COURSE:       Medical Decision Making:   Patient found to have otitis media URI and tonsillitis. Patient to be discharged on Augmentin and Medrol Dosepak. Recommended NSAIDs or Tylenol as needed. We discussed importance of close follow-up with her primary care physician on Monday or Tuesday. Patient verbalized understanding of instructions     Risks and benefits were discussed with patient for All medications dispensed and given in department as well prescriptions prescribed for home, . The patient elected to take the medicine. Pt instructed on warning signs and precautions for medication side effects. The patient was given warning signs for when to seek medical attention.        Counseled regarding todays diagnosis, including possible risks and complications,  especially if left uncontrolled. Counseled regarding the possible side effects, risks, benefits and alternatives to treatment; patient and/or guardian verbalizes understanding, agrees, feels comfortable with and wishes to proceed with treatment plan. Advised patient to call her primary care physician with any new medication issues, and read all Rx info from pharmacy to assure aware of all possible risks and side effects of medication before taking. I did discuss warning signs for when to return to the Emergency Room, and the patient verbalized understanding      Counseling: The emergency provider has spoken with the patient and discussed todays results, in addition to providing specific details for the plan of care and counseling regarding the diagnosis and prognosis. Questions are answered at this time and they are agreeable with the plan.      --------------------------------- IMPRESSION AND DISPOSITION ---------------------------------    IMPRESSION  1. Other non-recurrent acute nonsuppurative otitis media of left ear    2. Upper respiratory tract infection, unspecified type    3. Acute pharyngitis, unspecified etiology        DISPOSITION  Disposition: Discharge to home  Patient condition is good      NOTE: This report was transcribed using voice recognition software.  Every effort was made to ensure accuracy; however, inadvertent computerized transcription errors may be present       Yolie Mcgee DO  11/18/22 [id number]

## 2023-10-03 PROCEDURE — 99284 EMERGENCY DEPT VISIT MOD MDM: CPT

## 2023-10-03 PROCEDURE — 96374 THER/PROPH/DIAG INJ IV PUSH: CPT

## 2023-10-03 PROCEDURE — 80053 COMPREHEN METABOLIC PANEL: CPT

## 2023-10-03 PROCEDURE — 83690 ASSAY OF LIPASE: CPT

## 2023-10-03 PROCEDURE — 85027 COMPLETE CBC AUTOMATED: CPT

## 2023-10-03 PROCEDURE — 83605 ASSAY OF LACTIC ACID: CPT

## 2023-10-03 PROCEDURE — 81001 URINALYSIS AUTO W/SCOPE: CPT

## 2023-10-03 PROCEDURE — 82150 ASSAY OF AMYLASE: CPT

## 2023-10-03 PROCEDURE — 96375 TX/PRO/DX INJ NEW DRUG ADDON: CPT

## 2023-10-03 PROCEDURE — 83735 ASSAY OF MAGNESIUM: CPT

## 2023-10-03 PROCEDURE — 84703 CHORIONIC GONADOTROPIN ASSAY: CPT

## 2023-10-03 ASSESSMENT — PAIN - FUNCTIONAL ASSESSMENT
PAIN_FUNCTIONAL_ASSESSMENT: INTOLERABLE, UNABLE TO DO ANY ACTIVE OR PASSIVE ACTIVITIES
PAIN_FUNCTIONAL_ASSESSMENT: 0-10

## 2023-10-03 ASSESSMENT — PAIN DESCRIPTION - DESCRIPTORS: DESCRIPTORS: TIGHTNESS;SQUEEZING

## 2023-10-03 ASSESSMENT — PATIENT HEALTH QUESTIONNAIRE - PHQ9
2. FEELING DOWN, DEPRESSED OR HOPELESS: 0
1. LITTLE INTEREST OR PLEASURE IN DOING THINGS: 0
SUM OF ALL RESPONSES TO PHQ9 QUESTIONS 1 & 2: 0
SUM OF ALL RESPONSES TO PHQ QUESTIONS 1-9: 0

## 2023-10-03 ASSESSMENT — PAIN DESCRIPTION - LOCATION: LOCATION: ABDOMEN

## 2023-10-03 ASSESSMENT — PAIN DESCRIPTION - ORIENTATION: ORIENTATION: MID

## 2023-10-03 ASSESSMENT — LIFESTYLE VARIABLES
HOW OFTEN DO YOU HAVE A DRINK CONTAINING ALCOHOL: NEVER
HOW MANY STANDARD DRINKS CONTAINING ALCOHOL DO YOU HAVE ON A TYPICAL DAY: PATIENT DOES NOT DRINK

## 2023-10-03 ASSESSMENT — PAIN SCALES - GENERAL: PAINLEVEL_OUTOF10: 8

## 2023-10-03 ASSESSMENT — PAIN DESCRIPTION - ONSET: ONSET: ON-GOING

## 2023-10-03 ASSESSMENT — PAIN DESCRIPTION - PAIN TYPE: TYPE: ACUTE PAIN

## 2023-10-03 ASSESSMENT — PAIN DESCRIPTION - FREQUENCY: FREQUENCY: CONTINUOUS

## 2023-10-04 ENCOUNTER — APPOINTMENT (OUTPATIENT)
Dept: CT IMAGING | Age: 28
End: 2023-10-04
Payer: COMMERCIAL

## 2023-10-04 ENCOUNTER — HOSPITAL ENCOUNTER (EMERGENCY)
Age: 28
Discharge: HOME | End: 2023-10-04
Attending: EMERGENCY MEDICINE
Payer: COMMERCIAL

## 2023-10-04 VITALS
RESPIRATION RATE: 18 BRPM | SYSTOLIC BLOOD PRESSURE: 120 MMHG | TEMPERATURE: 97.1 F | DIASTOLIC BLOOD PRESSURE: 60 MMHG | OXYGEN SATURATION: 99 % | HEART RATE: 68 BPM

## 2023-10-04 DIAGNOSIS — J40 BRONCHITIS: ICD-10-CM

## 2023-10-04 DIAGNOSIS — J06.9 VIRAL UPPER RESPIRATORY TRACT INFECTION: ICD-10-CM

## 2023-10-04 DIAGNOSIS — R10.10 PAIN OF UPPER ABDOMEN: Primary | ICD-10-CM

## 2023-10-04 LAB
ALBUMIN SERPL-MCNC: 4.6 G/DL (ref 3.5–5.2)
ALP SERPL-CCNC: 122 U/L (ref 35–104)
ALT SERPL-CCNC: 32 U/L (ref 0–32)
AMYLASE SERPL-CCNC: 73 U/L (ref 20–100)
ANION GAP SERPL CALCULATED.3IONS-SCNC: 12 MMOL/L (ref 7–16)
AST SERPL-CCNC: 24 U/L (ref 0–31)
BACTERIA URNS QL MICRO: ABNORMAL
BILIRUB SERPL-MCNC: 0.2 MG/DL (ref 0–1.2)
BILIRUB UR QL STRIP: ABNORMAL
BUN SERPL-MCNC: 17 MG/DL (ref 6–20)
CALCIUM SERPL-MCNC: 9.8 MG/DL (ref 8.6–10.2)
CHLORIDE SERPL-SCNC: 105 MMOL/L (ref 98–107)
CLARITY UR: CLEAR
CO2 SERPL-SCNC: 25 MMOL/L (ref 22–29)
COLOR UR: YELLOW
CREAT SERPL-MCNC: 0.9 MG/DL (ref 0.5–1)
EPI CELLS #/AREA URNS HPF: ABNORMAL /HPF
ERYTHROCYTE [DISTWIDTH] IN BLOOD BY AUTOMATED COUNT: 14.1 % (ref 11.5–15)
FLUAV RNA RESP QL NAA+PROBE: NOT DETECTED
FLUBV RNA RESP QL NAA+PROBE: NOT DETECTED
GFR SERPL CREATININE-BSD FRML MDRD: >60 ML/MIN/1.73M2
GLUCOSE SERPL-MCNC: 116 MG/DL (ref 74–99)
GLUCOSE UR STRIP-MCNC: NEGATIVE MG/DL
HCG UR QL: NEGATIVE
HCT VFR BLD AUTO: 42.1 % (ref 34–48)
HGB BLD-MCNC: 14 G/DL (ref 11.5–15.5)
HGB UR QL STRIP.AUTO: NEGATIVE
KETONES UR STRIP-MCNC: NEGATIVE MG/DL
LACTATE BLDV-SCNC: 0.9 MMOL/L (ref 0.5–2.2)
LEUKOCYTE ESTERASE UR QL STRIP: NEGATIVE
LIPASE SERPL-CCNC: 23 U/L (ref 13–60)
MAGNESIUM SERPL-MCNC: 2.3 MG/DL (ref 1.6–2.6)
MCH RBC QN AUTO: 26.1 PG (ref 26–35)
MCHC RBC AUTO-ENTMCNC: 33.3 G/DL (ref 32–34.5)
MCV RBC AUTO: 78.5 FL (ref 80–99.9)
NITRITE UR QL STRIP: NEGATIVE
PH UR STRIP: 5.5 [PH] (ref 5–9)
PLATELET # BLD AUTO: 374 K/UL (ref 130–450)
PMV BLD AUTO: 9.7 FL (ref 7–12)
POTASSIUM SERPL-SCNC: 4.4 MMOL/L (ref 3.5–5)
PROT SERPL-MCNC: 7.5 G/DL (ref 6.4–8.3)
PROT UR STRIP-MCNC: ABNORMAL MG/DL
RBC # BLD AUTO: 5.36 M/UL (ref 3.5–5.5)
RBC #/AREA URNS HPF: ABNORMAL /HPF
SARS-COV-2 RNA RESP QL NAA+PROBE: NOT DETECTED
SODIUM SERPL-SCNC: 142 MMOL/L (ref 132–146)
SOURCE: NORMAL
SP GR UR STRIP: >1.03 (ref 1–1.03)
SPECIMEN DESCRIPTION: NORMAL
UROBILINOGEN UR STRIP-ACNC: 0.2 EU/DL (ref 0–1)
WBC #/AREA URNS HPF: ABNORMAL /HPF
WBC OTHER # BLD: 13.2 K/UL (ref 4.5–11.5)

## 2023-10-04 PROCEDURE — 6360000002 HC RX W HCPCS: Performed by: EMERGENCY MEDICINE

## 2023-10-04 PROCEDURE — 6370000000 HC RX 637 (ALT 250 FOR IP): Performed by: EMERGENCY MEDICINE

## 2023-10-04 PROCEDURE — 87636 SARSCOV2 & INF A&B AMP PRB: CPT

## 2023-10-04 PROCEDURE — 71045 X-RAY EXAM CHEST 1 VIEW: CPT

## 2023-10-04 PROCEDURE — 74176 CT ABD & PELVIS W/O CONTRAST: CPT

## 2023-10-04 RX ORDER — FAMOTIDINE 20 MG/1
20 TABLET, FILM COATED ORAL 2 TIMES DAILY
Qty: 60 TABLET | Refills: 3 | Status: SHIPPED | OUTPATIENT
Start: 2023-10-04

## 2023-10-04 RX ORDER — KETOROLAC TROMETHAMINE 30 MG/ML
15 INJECTION, SOLUTION INTRAMUSCULAR; INTRAVENOUS ONCE
Status: COMPLETED | OUTPATIENT
Start: 2023-10-04 | End: 2023-10-04

## 2023-10-04 RX ORDER — AZITHROMYCIN 250 MG/1
500 TABLET, FILM COATED ORAL ONCE
Status: COMPLETED | OUTPATIENT
Start: 2023-10-04 | End: 2023-10-04

## 2023-10-04 RX ORDER — AZITHROMYCIN 250 MG/1
TABLET, FILM COATED ORAL
Qty: 1 PACKET | Refills: 0 | Status: SHIPPED | OUTPATIENT
Start: 2023-10-04 | End: 2023-10-08

## 2023-10-04 RX ORDER — ONDANSETRON 4 MG/1
4 TABLET, FILM COATED ORAL EVERY 8 HOURS PRN
Qty: 12 TABLET | Refills: 0 | Status: SHIPPED | OUTPATIENT
Start: 2023-10-04

## 2023-10-04 RX ORDER — ONDANSETRON 2 MG/ML
4 INJECTION INTRAMUSCULAR; INTRAVENOUS ONCE
Status: COMPLETED | OUTPATIENT
Start: 2023-10-04 | End: 2023-10-04

## 2023-10-04 RX ADMIN — AZITHROMYCIN DIHYDRATE 500 MG: 250 TABLET ORAL at 04:34

## 2023-10-04 RX ADMIN — KETOROLAC TROMETHAMINE 15 MG: 30 INJECTION, SOLUTION INTRAMUSCULAR; INTRAVENOUS at 02:05

## 2023-10-04 RX ADMIN — ONDANSETRON 4 MG: 2 INJECTION INTRAMUSCULAR; INTRAVENOUS at 02:03

## 2023-10-04 ASSESSMENT — PAIN DESCRIPTION - ORIENTATION: ORIENTATION: LOWER;MID

## 2023-10-04 ASSESSMENT — PAIN - FUNCTIONAL ASSESSMENT: PAIN_FUNCTIONAL_ASSESSMENT: NONE - DENIES PAIN

## 2023-10-04 ASSESSMENT — PAIN SCALES - GENERAL: PAINLEVEL_OUTOF10: 7

## 2023-10-04 ASSESSMENT — PAIN DESCRIPTION - DESCRIPTORS: DESCRIPTORS: TENDER;SORE;DISCOMFORT

## 2023-10-04 ASSESSMENT — PAIN DESCRIPTION - LOCATION: LOCATION: ABDOMEN

## 2023-10-04 NOTE — DISCHARGE INSTRUCTIONS
If increased abdominal pain fevers vomiting chest pain or shortness of breath leg swelling or calf pain